# Patient Record
Sex: FEMALE | Race: WHITE | Employment: FULL TIME | URBAN - METROPOLITAN AREA
[De-identification: names, ages, dates, MRNs, and addresses within clinical notes are randomized per-mention and may not be internally consistent; named-entity substitution may affect disease eponyms.]

---

## 2021-06-22 ENCOUNTER — APPOINTMENT (OUTPATIENT)
Dept: GENERAL RADIOLOGY | Age: 36
End: 2021-06-22
Payer: COMMERCIAL

## 2021-06-22 ENCOUNTER — HOSPITAL ENCOUNTER (EMERGENCY)
Age: 36
Discharge: HOME | End: 2021-06-22
Attending: EMERGENCY MEDICINE
Payer: COMMERCIAL

## 2021-06-22 VITALS
SYSTOLIC BLOOD PRESSURE: 166 MMHG | OXYGEN SATURATION: 99 % | HEART RATE: 98 BPM | HEIGHT: 63 IN | TEMPERATURE: 97.7 F | BODY MASS INDEX: 23.04 KG/M2 | RESPIRATION RATE: 18 BRPM | WEIGHT: 130 LBS | DIASTOLIC BLOOD PRESSURE: 103 MMHG

## 2021-06-22 DIAGNOSIS — R03.0 ELEVATED BLOOD PRESSURE READING: ICD-10-CM

## 2021-06-22 DIAGNOSIS — S96.911A STRAIN OF RIGHT ANKLE, INITIAL ENCOUNTER: Primary | ICD-10-CM

## 2021-06-22 PROCEDURE — 99282 EMERGENCY DEPT VISIT SF MDM: CPT

## 2021-06-22 PROCEDURE — 73610 X-RAY EXAM OF ANKLE: CPT

## 2021-06-22 ASSESSMENT — PAIN DESCRIPTION - FREQUENCY: FREQUENCY: INTERMITTENT

## 2021-06-22 ASSESSMENT — PAIN DESCRIPTION - DESCRIPTORS: DESCRIPTORS: ACHING

## 2021-06-22 ASSESSMENT — PAIN DESCRIPTION - LOCATION: LOCATION: ANKLE

## 2021-06-22 ASSESSMENT — PAIN DESCRIPTION - PAIN TYPE: TYPE: ACUTE PAIN

## 2021-06-22 ASSESSMENT — PAIN DESCRIPTION - ORIENTATION: ORIENTATION: RIGHT

## 2021-06-22 ASSESSMENT — PAIN SCALES - GENERAL: PAINLEVEL_OUTOF10: 4

## 2021-06-22 NOTE — ED PROVIDER NOTES
CHIEF COMPLAINT  Chief Complaint   Patient presents with    Ankle Pain     RIGHT ANKLE FOR 3 WEEKS       HPI  Belén Quinteros is a 30 y.o. female with history of relative previous health who presents right ankle pain that is been intermittent, aching, waxing and waning over the past 3 weeks. She planted singh on her deck 3 weeks ago when she was sitting back on flexed heels at that time. The next day she noticed her ankle was sore. She then went on vacation where she was hiking up and down lots of hills. Today she woke up and the pain was aching, throbbing and pretty severe this morning prior to taking Motrin and wrapping it. She has been wrapping it intermittently with improvement. She is aching, throbbing pain. No history of DVT or PE. This morning the pain was overlying the distal Achilles region. She denies any specific trauma or injury to the area. She has also intermittently had pain in her knees and hips. No redness overlying the joints. She has been able to ambulate on the affected extremity without difficulty. REVIEW OF SYSTEMS  Review of Systems   History obtained from chart review and the patient  General ROS: negative for - fever  Ophthalmic ROS: negative  ENT ROS: negative  Hematological and Lymphatic ROS: negative for - blood clots  Endocrine ROS: negative for - unexpected weight changes  Respiratory ROS: no cough, shortness of breath, or wheezing  Cardiovascular ROS: no chest pain or dyspnea on exertion  Gastrointestinal ROS: no abdominal pain, change in bowel habits, or black or bloody stools  Genito-Urinary ROS: no dysuria, trouble voiding, or hematuria  Musculoskeletal ROS: positive for -right ankle pain  Neurological ROS: negative for - numbness/tingling or weakness      PAST MEDICAL HISTORY  No past medical history on file. FAMILY HISTORY  No family history on file.     SOCIAL HISTORY  Social History     Socioeconomic History    Marital status: Single     Spouse name: Not on file    Number of children: Not on file    Years of education: Not on file    Highest education level: Not on file   Occupational History    Not on file   Tobacco Use    Smoking status: Never Smoker    Smokeless tobacco: Never Used   Substance and Sexual Activity    Alcohol use: Yes     Comment: RARE    Drug use: Never    Sexual activity: Yes     Partners: Male   Other Topics Concern    Not on file   Social History Narrative    Not on file     Social Determinants of Health     Financial Resource Strain:     Difficulty of Paying Living Expenses:    Food Insecurity:     Worried About Running Out of Food in the Last Year:     [address] in the Last Year:    Transportation Needs:     Lack of Transportation (Medical):  Lack of Transportation (Non-Medical):    Physical Activity:     Days of Exercise per Week:     Minutes of Exercise per Session:    Stress:     Feeling of Stress :    Social Connections:     Frequency of Communication with Friends and Family:     Frequency of Social Gatherings with Friends and Family:     Attends [employer] Services:     Active Member of Clubs or Organizations:     Attends Club or Organization Meetings:     Marital Status:    Intimate Partner Violence:     Fear of Current or Ex-Partner:     Emotionally Abused:     Physically Abused:     Sexually Abused:        SURGICAL HISTORY  Past Surgical History:   Procedure Laterality Date    WISDOM TOOTH EXTRACTION         CURRENT MEDICATIONS  No current facility-administered medications on file prior to encounter. No current outpatient medications on file prior to encounter.          ALLERGIES  Allergies   Allergen Reactions    Phenergan [Promethazine] Other (See Comments)     CANNOT HAVE IT IN AN IV       PHYSICAL EXAM  VITAL SIGNS: BP (!) 166/103   Pulse 98   Temp 97.7 °F (36.5 °C) (Infrared)   Resp 18   Ht 5' 3\" (1.6 m)   Wt 130 lb (59 kg)   LMP 03/22/2021 (Approximate)   SpO2 99%   BMI 23.03 kg/m²   Constitutional: Well developed, Well nourished, No acute distress, Non-toxic appearance. Ambulating about the room at time of my entry  HENT: Normocephalic, Atraumatic, Bilateral external ears normal, mask in place  Eyes: PERRL, EOMI, Conjunctiva normal, No discharge. Neck: Normal range of motion, Supple, No stridor. Cardiovascular: Normal heart rate, Normal rhythm, No murmurs, No rubs, No gallops. Thorax & Lungs: Normal breath sounds, No respiratory distress, No wheezing, No chest tenderness. Abdomen: No Abdominal distention noted  Skin: Warm, Dry, No erythema, No rash. Extremities: Intact distal pulses, No edema, No tenderness, No cyanosis, No clubbing. Musculoskeletal: Good gross range of motion in all major joints. No major deformities noted. Neurologic: Alert & oriented x 3, Normal gross motor function, Normal gross sensory function, No focal deficits noted. Psychiatric: Affect normal  Right lower extremity: Mild tenderness on palpation of the posterior talus region without overlying skin changes. Medeiros test normal.  No tenderness on palpation of the lateral or medial malleolus or anterior joint line of the ankle. No tenderness overlying the midfoot or calf. Legs appear symmetric. Normal DP and PT pulse. Normal sensation all dermatomes of the foot. RADIOLOGY/PROCEDURES/LABS  Last Imaging results   XR ANKLE RIGHT (MIN 3 VIEWS)   Final Result   No acute abnormality of the ankle. Imaging reviewed by myself    COURSE & MEDICAL DECISION MAKING  Pertinent Labs & Imaging studies reviewed. (See chart for details)    60-year-old female presents with waxing and waning right ankle pain that is been intermittent over the past 3 weeks. She is planning on going on a hiking trip in [place] followed by going to [employer] later this summer and she wanted to ensure that there is no fracture or dislocation of the ankle.   Imaging without fracture or dislocation, no erythema overlying the area to suggest septic arthritis. No other joint swelling or joint pain at this time. Will be recommended for continued wrapping support, rehab exercises provided, recommend for Motrin, Tylenol, ice and heat alternating. She was made aware of her elevated blood pressure in discharge paperwork. Discharged to follow with primary care for recheck. No suggestion of DVT, Achilles rupture. FINAL IMPRESSION  Problem List Items Addressed This Visit     None      Visit Diagnoses     Strain of right ankle, initial encounter    -  Primary    Elevated blood pressure reading          1.    2.   3.    Patient gave me permission to discuss medical history, care, and plan with those present in the room.   Electronically signed by: Yossi Muhammad MD, 6/22/2021  MD Yossi Arora MD  06/22/21 4280

## 2021-06-22 NOTE — ED NOTES
The patient presents to the er today with complaints of right ankle pain. She denies any injury, reports that it has been hurting for 3 weeks after \"planting her flowers. \"  Reports that she has since been to [facility] and did a lot of walking. She also reports that she is going on another vacation where she will be doing a lot of walking. She wants to \" make sure it is ok to go. \"         Dario Taylor RN  06/22/21 [id number]

## 2022-12-09 ENCOUNTER — HOSPITAL ENCOUNTER (EMERGENCY)
Age: 37
Discharge: HOME | End: 2022-12-09
Attending: EMERGENCY MEDICINE
Payer: COMMERCIAL

## 2022-12-09 VITALS
SYSTOLIC BLOOD PRESSURE: 148 MMHG | HEIGHT: 63 IN | BODY MASS INDEX: 23.04 KG/M2 | DIASTOLIC BLOOD PRESSURE: 110 MMHG | WEIGHT: 130 LBS | RESPIRATION RATE: 18 BRPM | TEMPERATURE: 98.4 F | HEART RATE: 96 BPM | OXYGEN SATURATION: 99 %

## 2022-12-09 DIAGNOSIS — I10 HYPERTENSION, UNSPECIFIED TYPE: Primary | ICD-10-CM

## 2022-12-09 LAB
BILIRUBIN URINE: NEGATIVE MG/DL
BLOOD, URINE: NEGATIVE
CLARITY: CLEAR
COLOR: YELLOW
COMMENT UA: NORMAL
GLUCOSE, URINE: NEGATIVE MG/DL
INTERPRETATION: NORMAL
KETONES, URINE: NEGATIVE MG/DL
LEUKOCYTE ESTERASE, URINE: NEGATIVE
NITRITE URINE, QUANTITATIVE: NEGATIVE
PH, URINE: 7.5 (ref 5–8)
PREGNANCY, URINE: NEGATIVE
PROTEIN UA: NEGATIVE MG/DL
SPECIFIC GRAVITY UA: 1.01 (ref 1–1.03)
SPECIFIC GRAVITY, URINE: 1.01 (ref 1–1.03)
UROBILINOGEN, URINE: 0.2 MG/DL (ref 0.2–1)

## 2022-12-09 PROCEDURE — 81025 URINE PREGNANCY TEST: CPT

## 2022-12-09 PROCEDURE — 99283 EMERGENCY DEPT VISIT LOW MDM: CPT

## 2022-12-09 PROCEDURE — 81003 URINALYSIS AUTO W/O SCOPE: CPT

## 2022-12-09 RX ORDER — HYDROXYZINE HYDROCHLORIDE 25 MG/1
25 TABLET, FILM COATED ORAL EVERY 8 HOURS PRN
Qty: 30 TABLET | Refills: 0 | Status: SHIPPED | OUTPATIENT
Start: 2022-12-09 | End: 2022-12-19

## 2022-12-09 RX ORDER — DESOGESTREL AND ETHINYL ESTRADIOL 21-5 (28)
1 KIT ORAL DAILY
COMMUNITY

## 2022-12-09 NOTE — DISCHARGE INSTRUCTIONS
Return immediately to the emergency department if you experience headache, chest pain, shortness of breath, changes in vision/speech/swallowing, weakness or changes in sensation, urinary symptoms, or for any other concerns. Keep a blood pressure log to discuss with your primary care physician.

## 2022-12-09 NOTE — ED TRIAGE NOTES
Patient has been having URI symptoms for a couple days. Patient took an OTC cold and flu medication yesterday and felt shaky. BP was 170's/100's. BP this morning was 148/102 and patient still felt shaky. No CP. No SOB. Patient had a hypertensive BP reading at OB this summer. No medication or treatment.

## 2022-12-09 NOTE — ED PROVIDER NOTES
Emergency Department Encounter    Patient: Jl Frankel  MRN: [id number]  : 1985  Date of Evaluation: 12/15/2022  ED Provider:  Darshan Adkins MD    Triage Chief Complaint:   Hypertension (Patient has been having URI symptoms for a couple days. Patient took an OTC cold and flu medication yesterday and felt shaky. BP was 170's/100's. BP this morning was 148/102 and patient still felt shaky. No CP. No SOB. Patient had a hypertensive BP reading at OB this summer. No medication or treatment. )    Clinical Impression:  1. Hypertension, unspecified type      Disposition referral (if applicable):  Jennifer Escobar MD  [address] [place] [address]  [phone]    Call in 2 days    ED Provider Disposition Time  DISPOSITION Decision To Discharge 2022 11:08:25 AM       MDM:  Patient presents for evaluation of elevated blood pressure as below. Patient has had recent URI symptoms for which she took an over-the-counter decongestant. Patient states that her URI symptoms are improved. UA without evidence of proteinuria or microscopic hematuria. No evidence of urinary tract infection. Pregnancy screen negative. No evidence of hypertensive emergency by history or physical exam.  Patient had improvement in her blood pressure in the emergency department without pharmacologic intervention. Patient did seem anxious and was treated with Atarax. Patient will keep a blood pressure log log and follow-up with her primary care physician. Patient will be discharged with strict return precautions and follow-up instructions. Patient verbalized understanding and agreement to plan. Medications ordered in the ED:  ED Medication Orders (From admission, onward)      None            Disposition medications (if applicable):  Discharge Medication List as of 2022 11:05 AM            HPI:  Jl Frankel is a 40 y.o. female that presents for evaluation of hypertension.   Patient states that she has been having upper respiratory symptoms for a few days and took an over-the-counter cold and flu medication which did contain a decongestant. Patient felt shaky the day prior to presentation and blood pressure was found to be 170s over 100s. Patient denies any chest pain, shortness of breath, headache, changes in vision/speech/swallowing, weakness or changes in sensation. Patient denies any urinary symptoms. Patient states that her routine evaluation by OB, blood pressure was found to be elevated. Patient has not diagnosed with hypertension and is not on any antihypertensive medication. No known alleviating or exacerbating factors. Patient is otherwise asymptomatic at this time. She reports that her URI symptoms have improved. ROS - see HPI, below listed is current ROS at time of my eval:  General:  No fevers  Eyes:  No eye discharge  ENT:  No ear discharge  Cardiovascular:  No palpitations  Respiratory:  No wheezing  Gastrointestinal:  No hematemesis  Musculoskeletal:  No muscle pain  Skin:  No purpura  Neurologic:  No headache  Psychiatric:  No homicidal ideation  Genitourinary:  No hematuria  Endocrine:  No unexpected weight gain  Extremities:  No edema    Physical Exam:  Triage VS:    ED Triage Vitals [12/09/22 1036]   Enc Vitals Group      BP (!) 176/95      Heart Rate 98      Resp 18      Temp 98.4 °F (36.9 °C)      Temp Source Oral      SpO2 99 %      Weight 130 lb (59 kg)      Height 5' 3\" (1.6 m)      Head Circumference       Peak Flow       Pain Score       Pain Loc       Pain Edu? Excl. in [address]? General appearance:  No acute distress. Skin:  Warm. Dry. Eye:  Extraocular movements intact. Ears, nose, mouth and throat:  Oral mucosa moist   Neck:  Trachea midline. Extremity:  No swelling. Normal ROM     Heart:  Regular rate and rhythm, normal S1 & S2, no extra heart sounds. Perfusion:  intact  Respiratory:  Lungs clear to auscultation bilaterally. Respirations nonlabored. Abdominal:  Normal bowel sounds. Soft. Nontender. Non distended. Back:  No CVA tenderness to palpation     Neurological:  Alert and oriented times 3. No focal neuro deficits. Cranial nerves II through XII grossly intact. Strength 5 out of 5 throughout. Sensation intact light touch throughout. Psychiatric:  Appropriate    No past medical history on file. Past Surgical History:   Procedure Laterality Date    WISDOM TOOTH EXTRACTION       No family history on file. Social History     Socioeconomic History    Marital status: Single     Spouse name: Not on file    Number of children: Not on file    Years of education: Not on file    Highest education level: Not on file   Occupational History    Not on file   Tobacco Use    Smoking status: Never    Smokeless tobacco: Never   Substance and Sexual Activity    Alcohol use: Yes     Comment: RARE    Drug use: Never    Sexual activity: Yes     Partners: Male   Other Topics Concern    Not on file   Social History Narrative    Not on file     Social Determinants of Health     Financial Resource Strain: Not on file   Food Insecurity: Not on file   Transportation Needs: Not on file   Physical Activity: Not on file   Stress: Not on file   Social Connections: Not on file   Intimate Partner Violence: Not on file   Housing Stability: Not on file     No current facility-administered medications for this encounter.      Current Outpatient Medications   Medication Sig Dispense Refill    hydrOXYzine HCl (ATARAX) 25 MG tablet Take 1 tablet by mouth every 8 hours as needed for Anxiety 30 tablet 0    desogestrel-ethinyl estradiol (KARIVA) 0.15-0.02/0.01 MG (21/5) per tablet Take 1 tablet by mouth daily       Allergies   Allergen Reactions    Phenergan [Promethazine] Other (See Comments)     CANNOT HAVE IT IN AN IV       Nursing Notes Reviewed    I have reviewed and interpreted all of the currently available lab results from this visit (if applicable):  Results for orders placed or performed during the hospital encounter of 12/09/22   Urinalysis   Result Value Ref Range    Color, UA YELLOW YELLOW    Clarity, UA CLEAR CLEAR    Glucose, Urine NEGATIVE NEGATIVE MG/DL    Bilirubin Urine NEGATIVE NEGATIVE MG/DL    Ketones, Urine NEGATIVE NEGATIVE MG/DL    Specific Gravity, UA 1.010 1.001 - 1.035    Blood, Urine NEGATIVE NEGATIVE    pH, Urine 7.5 5.0 - 8.0    Protein, UA NEGATIVE NEGATIVE MG/DL    Urobilinogen, Urine 0.2 0.2 - 1.0 MG/DL    Nitrite Urine, Quantitative NEGATIVE NEGATIVE    Leukocyte Esterase, Urine NEGATIVE NEGATIVE    Urinalysis Comments       Microscopic exam not performed based on chemical results unless requested in original order. HCG, Urine, Qualitative, Pregnancy (Lab)   Result Value Ref Range    Pregnancy, Urine NEGATIVE NEGATIVE    Specific Gravity, Urine 1.010 1.001 - 1.035    Interpretation HCG METHOD LIMITATIONS:       Radiographs (if obtained):  Radiologist's Report Reviewed:  No orders to display         Comment: Please note this report has been produced using speech recognition software and may contain errors related to that system including errors in grammar, punctuation, and spelling, as well as words and phrases that may be inappropriate. Efforts were made to edit the dictations.         Jose Daniel Cevallos MD  12/15/22 8825